# Patient Record
Sex: FEMALE | Race: WHITE | NOT HISPANIC OR LATINO | URBAN - METROPOLITAN AREA
[De-identification: names, ages, dates, MRNs, and addresses within clinical notes are randomized per-mention and may not be internally consistent; named-entity substitution may affect disease eponyms.]

---

## 2022-11-25 ENCOUNTER — EMERGENCY (EMERGENCY)
Facility: HOSPITAL | Age: 37
LOS: 1 days | Discharge: HOME | End: 2022-11-25
Attending: EMERGENCY MEDICINE | Admitting: EMERGENCY MEDICINE
Payer: MEDICAID

## 2022-11-25 VITALS
TEMPERATURE: 98 F | HEART RATE: 85 BPM | SYSTOLIC BLOOD PRESSURE: 116 MMHG | DIASTOLIC BLOOD PRESSURE: 76 MMHG | OXYGEN SATURATION: 99 % | WEIGHT: 132.94 LBS | RESPIRATION RATE: 18 BRPM | HEIGHT: 65 IN

## 2022-11-25 LAB
APPEARANCE UR: CLEAR — SIGNIFICANT CHANGE UP
BACTERIA # UR AUTO: PRESENT /HPF
BILIRUB UR-MCNC: NEGATIVE — SIGNIFICANT CHANGE UP
COLOR SPEC: YELLOW — SIGNIFICANT CHANGE UP
COMMENT - URINE: SIGNIFICANT CHANGE UP
DIFF PNL FLD: ABNORMAL
EPI CELLS # UR: ABNORMAL /HPF (ref 0–5)
FLUAV AG NPH QL: SIGNIFICANT CHANGE UP
FLUBV AG NPH QL: SIGNIFICANT CHANGE UP
GLUCOSE UR QL: NEGATIVE — SIGNIFICANT CHANGE UP
KETONES UR-MCNC: 15 MG/DL
LEUKOCYTE ESTERASE UR-ACNC: NEGATIVE — SIGNIFICANT CHANGE UP
NITRITE UR-MCNC: NEGATIVE — SIGNIFICANT CHANGE UP
PH UR: 6 — SIGNIFICANT CHANGE UP (ref 5–8)
PROT UR-MCNC: ABNORMAL MG/DL
RBC CASTS # UR COMP ASSIST: < 5 /HPF — SIGNIFICANT CHANGE UP
RSV RNA NPH QL NAA+NON-PROBE: SIGNIFICANT CHANGE UP
SARS-COV-2 RNA SPEC QL NAA+PROBE: SIGNIFICANT CHANGE UP
SP GR SPEC: 1.02 — SIGNIFICANT CHANGE UP (ref 1–1.03)
UROBILINOGEN FLD QL: 0.2 E.U./DL — SIGNIFICANT CHANGE UP
WBC UR QL: < 5 /HPF — SIGNIFICANT CHANGE UP

## 2022-11-25 PROCEDURE — 71045 X-RAY EXAM CHEST 1 VIEW: CPT | Mod: 26

## 2022-11-25 PROCEDURE — 87637 SARSCOV2&INF A&B&RSV AMP PRB: CPT

## 2022-11-25 PROCEDURE — 71045 X-RAY EXAM CHEST 1 VIEW: CPT

## 2022-11-25 PROCEDURE — 99284 EMERGENCY DEPT VISIT MOD MDM: CPT | Mod: 25

## 2022-11-25 PROCEDURE — 99283 EMERGENCY DEPT VISIT LOW MDM: CPT | Mod: 25

## 2022-11-25 PROCEDURE — 87086 URINE CULTURE/COLONY COUNT: CPT

## 2022-11-25 PROCEDURE — 81001 URINALYSIS AUTO W/SCOPE: CPT

## 2022-11-25 RX ORDER — IBUPROFEN 200 MG
600 TABLET ORAL ONCE
Refills: 0 | Status: COMPLETED | OUTPATIENT
Start: 2022-11-25 | End: 2022-11-25

## 2022-11-25 RX ADMIN — Medication 600 MILLIGRAM(S): at 14:46

## 2022-11-25 NOTE — ED PROVIDER NOTE - OBJECTIVE STATEMENT
37 F co int fever x 5 days- co int f/c neck soreness- no nuchal signs- no ha no photophobia no n/v   mild mod severity tia po appears well fever taken pta with good result  no other sig pmh  dx w ear infection at outside ED 2 days ago- started on zpack

## 2022-11-25 NOTE — ED ADULT NURSE NOTE - OBJECTIVE STATEMENT
pt is a 36 y/o F, sent in by PCP to r/o meningitis. hx endometriosis. pt reports fever TMAX 103 and neck pain x 5 days. pt recently seen at neighboring UC and Dx with R otitis media, and prescribed a Zpack.  denies CP, SOB, numbness/tingling, back pain, weakness, focal deficits, n/v/d,  sx, or cough. No nuchal rigidity, negative Brudzinski's sign.

## 2022-11-25 NOTE — ED PROVIDER NOTE - PATIENT PORTAL LINK FT
You can access the FollowMyHealth Patient Portal offered by [facility] by registering at the following website: [email or web address]. By joining [facility]’s FollowMyHealth portal, you will also be able to view your health information using other applications (apps) compatible with our system.

## 2022-11-25 NOTE — ED ADULT TRIAGE NOTE - CHIEF COMPLAINT QUOTE
pt sent in by PCP to r/o meningitis. rpts fever TMAX 103 and neck pain x 5 days. denies CP, SOB, numbness/tingling.

## 2022-11-25 NOTE — ED PROVIDER NOTE - NSFOLLOWUPINSTRUCTIONS_ED_ALL_ED_FT
Fever, Adult        A person using an oral thermometer.       A person holding a temporal thermometer to another person's forehead.     A fever is an increase in the body's temperature. It is usually defined as a temperature of 100.4°F (38°C) or higher. Brief mild or moderate fevers generally have no long-term effects, and they often do not need treatment. Moderate or high fevers may make you feel uncomfortable and can sometimes be a sign of a serious illness or disease. The sweating that may occur with repeated or prolonged fever may also cause a loss of fluid in the body (dehydration).    Fever is confirmed by taking a temperature with a thermometer. A measured temperature can vary with:  •Age.      •Time of day.    •Where in the body you take the temperature. Readings may vary if you place the thermometer:  •In the mouth (oral).      •In the rectum (rectal).      •In the ear (tympanic).      •Under the arm (axillary).      •On the forehead (temporal).          Follow these instructions at home:    Medicines     •Take over-the counter and prescription medicines only as told by your health care provider. Follow the dosing instructions carefully.      •If you were prescribed an antibiotic medicine, take it as told by your health care provider. Do not stop taking the antibiotic even if you start to feel better.      General instructions     •Watch your condition for any changes. Let your health care provider know about them.      •Rest as needed.      •Drink enough fluid to keep your urine pale yellow. This helps to prevent dehydration.      •Sponge yourself or bathe with room-temperature water to help reduce your body temperature as needed. Do not use ice water.      • Do not use too many blankets or wear clothes that are too heavy.      •If your fever may be caused by an infection that spreads from person to person (is contagious), such as a cold or the flu, you should stay home from work and public gatherings for at least 24 hours after your fever is gone. Your fever should be gone without the need to use medicines.        Contact a health care provider if:    •You vomit.      •You cannot eat or drink without vomiting.      •You have diarrhea.      •You have pain when you urinate.      •Your symptoms do not improve with treatment.      •You develop new symptoms.      •You develop excessive weakness.        Get help right away if:    •You have shortness of breath or have trouble breathing.      •You are dizzy or you faint.      •You are disoriented or confused.    •You develop signs of dehydration, such as:  •Dark urine, very little urine, or no urine.      •Cracked lips.      •Dry mouth.      •Sunken eyes.      •Sleepiness.      •Weakness.        •You develop severe pain in your abdomen.      •You have persistent vomiting or diarrhea.      •You develop a skin rash.      •Your symptoms suddenly get worse.        Summary    •A fever is an increase in the body's temperature. It is usually defined as a temperature of 100.4°F (38°C) or higher. Moderate or high fevers can sometimes be a sign of a serious illness or disease. The sweating that may occur with repeated or prolonged fever may also cause dehydration.      •Pay attention to any changes in your symptoms and contact your health care provider if your symptoms do not improve with treatment.      •Take over-the counter and prescription medicines only as told by your health care provider. Follow the dosing instructions carefully.      •If your fever is from an infection that may be contagious, such as cold or flu, you should stay home from work and public gatherings for at least 24 hours after your fever is gone. Your fever should be gone without the need to use medicines.      •Get help right away if you develop signs of dehydration, such as dark urine, cracked lips, dry mouth, sunken eyes, sleepiness, or weakness.      This information is not intended to replace advice given to you by your health care provider. Make sure you discuss any questions you have with your health care provider.      Document Revised: 05/10/2022 Document Reviewed: 05/10/2022    [employer] Patient Education © 2022 [employer]

## 2022-11-25 NOTE — ED ADULT NURSE NOTE - NSIMPLEMENTINTERV_GEN_ALL_ED
Implemented All Universal Safety Interventions:  [place] to call system. Call bell, personal items and telephone within reach. Instruct patient to call for assistance. Room bathroom lighting operational. Non-slip footwear when patient is off stretcher. Physically safe environment: no spills, clutter or unnecessary equipment. Stretcher in lowest position, wheels locked, appropriate side rails in place.

## 2022-11-25 NOTE — ED PROVIDER NOTE - CLINICAL SUMMARY MEDICAL DECISION MAKING FREE TEXT BOX
fever- x 4 days- viral swab neg- px looks clinically well- snacking on cereal no photophobia no nuchal rigidity  + dullness fluid behind R ear w some ttp- no gu sx- will change abx to cover sinus infection   si/sx to return d/w px

## 2022-11-27 LAB
CULTURE RESULTS: NO GROWTH — SIGNIFICANT CHANGE UP
SPECIMEN SOURCE: SIGNIFICANT CHANGE UP

## 2022-11-29 DIAGNOSIS — Z20.822 CONTACT WITH AND (SUSPECTED) EXPOSURE TO COVID-19: ICD-10-CM

## 2022-11-29 DIAGNOSIS — Z88.0 ALLERGY STATUS TO PENICILLIN: ICD-10-CM

## 2022-11-29 DIAGNOSIS — M54.2 CERVICALGIA: ICD-10-CM

## 2022-11-29 DIAGNOSIS — R50.9 FEVER, UNSPECIFIED: ICD-10-CM

## 2023-11-18 ENCOUNTER — EMERGENCY (EMERGENCY)
Facility: HOSPITAL | Age: 38
LOS: 1 days | Discharge: HOME | End: 2023-11-18
Attending: EMERGENCY MEDICINE | Admitting: EMERGENCY MEDICINE
Payer: MEDICAID

## 2023-11-18 VITALS
OXYGEN SATURATION: 99 % | TEMPERATURE: 98 F | RESPIRATION RATE: 18 BRPM | HEART RATE: 82 BPM | SYSTOLIC BLOOD PRESSURE: 127 MMHG | DIASTOLIC BLOOD PRESSURE: 75 MMHG

## 2023-11-18 PROCEDURE — 99283 EMERGENCY DEPT VISIT LOW MDM: CPT

## 2023-11-18 NOTE — ED ADULT NURSE NOTE - NSFALLUNIVINTERV_ED_ALL_ED
Bed/Stretcher in lowest position, wheels locked, appropriate side rails in place/Call bell, personal items and telephone in reach/Instruct patient to call for assistance before getting out of bed/chair/stretcher/Non-slip footwear applied when patient is off stretcher/[place] to call system/Physically safe environment - no spills, clutter or unnecessary equipment/Purposeful proactive rounding/Room/bathroom lighting operational, light cord in reach

## 2023-11-18 NOTE — ED PROVIDER NOTE - CLINICAL SUMMARY MEDICAL DECISION MAKING FREE TEXT BOX
This patient presents to the emergency department complaining of weeks of vaginal discomfort, pain on intercourse.  History of Ureaplasma.  On antibiotics.  Multiple visits to GYN.  Multiple visits to the emergency department.  Patient's symptoms are primarily that she feels that her labia minora are "deflated  ".  Differential diagnosis includes but is not limited to, chronic UTI, perimenopausal symptoms, more.  Differential diagnosis could possibly include life-threatening illnesses.  Patient instructed to follow-up with her gynecologist on Monday.  Patient instructed to not put any creams, suppositories, ointments on her vaginal area at this time.  She should wait to see her gynecologist.

## 2023-11-18 NOTE — ED PROVIDER NOTE - OBJECTIVE STATEMENT
This patient is a 38-year-old female, otherwise well.  Patient presents to the emergency department with a complaint of vaginal pain.  Patient states that this has been going on for multiple weeks.  It started at the end of last month with pain on intercourse.  Patient then noticed that her vaginal labia appeared "deflated".  Patient states that she has a long history of Ureaplasma.  Patient has been on doxycycline and other antibiotics.  She was treated for yeast infection.  She is seen multiple gynecologist.  Been to multiple emergency departments.  In the emergency department she is awake and alert, anxious and tearful. This patient is a 38-year-old female, otherwise well.  Patient presents to the emergency department with a complaint of vaginal pain.  Patient states that this has been going on for multiple weeks.  It started at the end of last month with pain on intercourse.  Patient then noticed that her vaginal labia appeared "deflated".  Patient states that she has a long history of Ureaplasma.  Patient has been on doxycycline and other antibiotics.  She was treated for yeast infection.  She has seen multiple gynecologist, she has been to multiple emergency departments, but has not found a diagnosis..  In the emergency department she is awake and alert, anxious and tearful. Patient is concerned for cauda equina. Patient with no bowl or bladder incontinence.

## 2023-11-18 NOTE — ED PROVIDER NOTE - PHYSICAL EXAMINATION
Patient is awake and alert, tearful, anxious.  Vital signs within normal.  No respiratory distress noted.  Abdomen soft.  External vaginal exam, basically within normal.  Labia minora slightly thinner, less plump than labia majora.  Patient states that this is not her usual appearance. Patient is awake and alert, tearful, anxious.  Vital signs within normal.  No respiratory distress noted.  Abdomen soft.  External vaginal exam, basically within normal.  Labia minora slightly thinner, less plump than labia majora.  No discharge, no bleeding. No lesions, no vesicles. Patient states that this is not her usual appearance.

## 2023-11-18 NOTE — ED PROVIDER NOTE - PATIENT PORTAL LINK FT
You can access the FollowMyHealth Patient Portal offered by [facility] by registering at the following website: [email or web address]. By joining [facility]’s FollowMyHealth portal, you will also be able to view your health information using other applications (apps) compatible with our system.

## 2023-11-18 NOTE — ED PROVIDER NOTE - PROGRESS NOTE DETAILS
Patient very anxious, feeling "dismissed". Patient requesting another provider. Seen by PA who agrees no medical emergency at this time. Patient given further referrals to other GYN, ID and fertility clinic.

## 2023-11-18 NOTE — ED ADULT TRIAGE NOTE - CHIEF COMPLAINT QUOTE
Pt complaining of numbness to x genitalia for 5 days. Pt tested positive with ureaplasma infection 2 days ago and started doxyclycline.

## 2023-11-18 NOTE — ED PROVIDER NOTE - NS ED ROS FT
Patient has no headache, no fever, no chills, no chest pain, no shortness of breath, no cough, no URI symptoms.  No abdominal pain.  No nausea, no vomiting.  Patient with vaginal discomfort, no dysuria at this time.  No foul smell.  No diarrhea.  No constipation.

## 2023-11-18 NOTE — ED ADULT NURSE NOTE - OBJECTIVE STATEMENT
Pt came in c/o genital numbness x 5 days. Pt reports dysuria x 3 weeks ago that resolved. Pt reports going to a urologist and gyn doctor. Pt reports testing positive for ureaplasma x 2 days ago. A&Ox3 speaking in full sentences. Denies loss of bowel/bladder control

## 2023-11-18 NOTE — ED PROVIDER NOTE - NSFOLLOWUPINSTRUCTIONS_ED_ALL_ED_FT
Thank you for letting us take care of you at the [facility] emergency department.  You presented to the emergency department with vaginal pain.  At this time there is no significant sign of infection or other serious illness.  Please follow-up with your gynecologist as intended next week.  Do not put anything in her on your vaginal area until you see the specialist.  Take care and be well. Thank you for letting us take care of you at the [facility] emergency department.  You presented to the emergency department with vaginal pain.  At this time there is no significant sign of infection or other serious illness.  Please follow-up with your gynecologist as intended next week.  Do not put anything in or on your vaginal area until you see the specialist.  Take care and be well.

## 2023-11-18 NOTE — ED ADULT NURSE REASSESSMENT NOTE - NS ED NURSE REASSESS COMMENT FT1
At time of discharge, pt is requesting a second opinion. Charge nurse made aware as well as Dr Hunt and Dr Lorenz. Discharge delayed at this time. At time of discharge, pt is requesting a second opinion. Charge nurse made aware as well as Dr Hunt and Dr Lorenz. Discharge delayed at this time. Nursing transfer of care to ANGELITO Camacho.

## 2023-11-22 DIAGNOSIS — Z88.0 ALLERGY STATUS TO PENICILLIN: ICD-10-CM

## 2023-11-22 DIAGNOSIS — R10.2 PELVIC AND PERINEAL PAIN: ICD-10-CM

## 2023-11-22 DIAGNOSIS — Z88.1 ALLERGY STATUS TO OTHER ANTIBIOTIC AGENTS STATUS: ICD-10-CM
